# Patient Record
Sex: FEMALE | Race: WHITE | URBAN - METROPOLITAN AREA
[De-identification: names, ages, dates, MRNs, and addresses within clinical notes are randomized per-mention and may not be internally consistent; named-entity substitution may affect disease eponyms.]

---

## 2023-04-20 ENCOUNTER — OFFICE VISIT (OUTPATIENT)
Dept: URBAN - METROPOLITAN AREA CLINIC 46 | Facility: CLINIC | Age: 88
End: 2023-04-20
Payer: MEDICARE

## 2023-04-20 DIAGNOSIS — H35.3111 NONEXUDATIVE MACULAR DEGENERATION, EARLY DRY STAGE, RIGHT EYE: ICD-10-CM

## 2023-04-20 DIAGNOSIS — Z96.1 PRESENCE OF INTRAOCULAR LENS: ICD-10-CM

## 2023-04-20 DIAGNOSIS — H35.3223 EXUDATIVE MACULAR DEGENERATION, INACTIVE SCAR, LEFT EYE: Primary | ICD-10-CM

## 2023-04-20 PROCEDURE — 99214 OFFICE O/P EST MOD 30 MIN: CPT | Performed by: OPHTHALMOLOGY

## 2023-04-20 PROCEDURE — 92134 CPTRZ OPH DX IMG PST SGM RTA: CPT | Performed by: OPHTHALMOLOGY

## 2023-04-20 ASSESSMENT — INTRAOCULAR PRESSURE
OD: 16
OS: 15

## 2023-04-20 NOTE — IMPRESSION/PLAN
Impression: Nonexudative macular degeneration, early dry stage, right eye: H35.3111. Plan: Patient advised that their macular degeneration appears stable. They are advised to continue AREDS/AREDS2 and the [employer]msler grid. We will continue to monitor periodically; call if any changes noted.

## 2023-04-20 NOTE — IMPRESSION/PLAN
Impression: Exudative macular degeneration, inactive scar, left eye: H33.9184. Plan: Patient advised that their macular degeneration appears inactive. Patient hasn't been here in almost two years. It seems like in that time, some swelling occurred and resolved on it's own and a scar now remains. Advised to continue AREDS/AREDS2 and the [employer] grid. We will continue to monitor periodically; call if any changes noted.

## 2023-06-07 ENCOUNTER — OFFICE VISIT (OUTPATIENT)
Dept: URBAN - METROPOLITAN AREA CLINIC 46 | Facility: CLINIC | Age: 88
End: 2023-06-07
Payer: MEDICARE

## 2023-06-07 DIAGNOSIS — Z96.1 PRESENCE OF INTRAOCULAR LENS: ICD-10-CM

## 2023-06-07 DIAGNOSIS — H35.3111 NONEXUDATIVE AGE-RELATED MACULAR DEGENERATION, RIGHT EYE, EARLY DRY STAGE: Primary | ICD-10-CM

## 2023-06-07 DIAGNOSIS — H35.3223 EXUDATIVE MACULAR DEGENERATION, INACTIVE SCAR, LEFT EYE: ICD-10-CM

## 2023-06-07 PROCEDURE — 92134 CPTRZ OPH DX IMG PST SGM RTA: CPT | Performed by: OPHTHALMOLOGY

## 2023-06-07 PROCEDURE — 99214 OFFICE O/P EST MOD 30 MIN: CPT | Performed by: OPHTHALMOLOGY

## 2023-06-07 ASSESSMENT — INTRAOCULAR PRESSURE
OS: 13
OD: 15

## 2023-06-07 NOTE — IMPRESSION/PLAN
Impression: Exudative macular degeneration, inactive scar, left eye: H39.5176. Plan: Patient advised that their macular degeneration appears inactive. Patient hasn't been here in almost two years. It seems like in that time, some swelling occurred and resolved on it's own and a scar now remains. Advised to continue AREDS/AREDS2 and the [employer] grid. We will continue to monitor periodically; call if any changes noted.

## 2023-12-20 ENCOUNTER — OFFICE VISIT (OUTPATIENT)
Dept: URBAN - METROPOLITAN AREA CLINIC 46 | Facility: CLINIC | Age: 88
End: 2023-12-20
Payer: MEDICARE

## 2023-12-20 DIAGNOSIS — H35.3223 EXUDATIVE MACULAR DEGENERATION, INACTIVE SCAR, LEFT EYE: ICD-10-CM

## 2023-12-20 DIAGNOSIS — H35.3111 NONEXUDATIVE AGE-RELATED MACULAR DEGENERATION, RIGHT EYE, EARLY DRY STAGE: Primary | ICD-10-CM

## 2023-12-20 DIAGNOSIS — H16.223 KERATOCONJUNCTIVITIS SICCA, BILATERAL: ICD-10-CM

## 2023-12-20 DIAGNOSIS — Z96.1 PRESENCE OF INTRAOCULAR LENS: ICD-10-CM

## 2023-12-20 PROCEDURE — 92134 CPTRZ OPH DX IMG PST SGM RTA: CPT | Performed by: OPHTHALMOLOGY

## 2023-12-20 PROCEDURE — 99214 OFFICE O/P EST MOD 30 MIN: CPT | Performed by: OPHTHALMOLOGY

## 2023-12-20 ASSESSMENT — INTRAOCULAR PRESSURE
OD: 14
OS: 14

## 2024-02-01 ENCOUNTER — OFFICE VISIT (OUTPATIENT)
Dept: URBAN - METROPOLITAN AREA CLINIC 46 | Facility: CLINIC | Age: 89
End: 2024-02-01
Payer: MEDICARE

## 2024-02-01 DIAGNOSIS — H35.3221 EXDTVE AGE-REL MCLR DEGN, LEFT EYE, WITH ACTV CHRDL NEOVAS: Primary | ICD-10-CM

## 2024-02-01 DIAGNOSIS — H35.3112 NEXDTVE AGE-RELATED MCLR DEGN, RIGHT EYE, INTERMED DRY STAGE: ICD-10-CM

## 2024-02-01 PROCEDURE — 67028 INJECTION EYE DRUG: CPT | Performed by: SPECIALIST

## 2024-02-01 PROCEDURE — 92134 CPTRZ OPH DX IMG PST SGM RTA: CPT | Performed by: SPECIALIST

## 2024-02-01 PROCEDURE — 99213 OFFICE O/P EST LOW 20 MIN: CPT | Performed by: SPECIALIST

## 2024-02-01 RX ORDER — BROMFENAC SODIUM 0.7 MG/ML
SOLUTION/ DROPS OPHTHALMIC
Refills: 0 | Status: ACTIVE
Start: 2024-02-01

## 2024-02-01 RX ORDER — UBIDECARENONE 100 MG
CAPSULE ORAL
Refills: 0 | Status: ACTIVE
Start: 2024-02-01

## 2024-02-01 RX ORDER — ASCORBIC ACID 500 MG
TABLET,CHEWABLE ORAL
Refills: 0 | Status: ACTIVE
Start: 2024-02-01

## 2024-02-01 RX ORDER — MAGNESIUM 200 MG
200 MG TABLET ORAL
Refills: 0 | Status: ACTIVE
Start: 2024-02-01

## 2024-02-01 ASSESSMENT — INTRAOCULAR PRESSURE
OD: 15
OS: 14

## 2024-12-02 ENCOUNTER — OFFICE VISIT (OUTPATIENT)
Dept: URBAN - METROPOLITAN AREA CLINIC 47 | Facility: CLINIC | Age: 89
End: 2024-12-02
Payer: MEDICARE

## 2024-12-02 DIAGNOSIS — H35.3112 NONEXUDATIVE AGE-RELATED MACULAR DEGENERATION, RIGHT EYE, INTERMEDIATE DRY STAGE: ICD-10-CM

## 2024-12-02 DIAGNOSIS — H33.8 OTHER RETINAL DETACHMENTS: ICD-10-CM

## 2024-12-02 DIAGNOSIS — H35.3221 EXUDATIVE AGE-RELATED MACULAR DEGENERATION, LEFT EYE, WITH ACTIVE CHOROIDAL NEOVASCULARIZATION: Primary | ICD-10-CM

## 2024-12-02 DIAGNOSIS — H43.811 VITREOUS DEGENERATION, RIGHT EYE: ICD-10-CM

## 2024-12-02 PROCEDURE — 92134 CPTRZ OPH DX IMG PST SGM RTA: CPT | Performed by: OPHTHALMOLOGY

## 2024-12-02 PROCEDURE — 99204 OFFICE O/P NEW MOD 45 MIN: CPT | Performed by: OPHTHALMOLOGY

## 2024-12-02 ASSESSMENT — INTRAOCULAR PRESSURE
OD: 14
OS: 14

## 2025-03-17 ENCOUNTER — OFFICE VISIT (OUTPATIENT)
Dept: URBAN - METROPOLITAN AREA CLINIC 47 | Facility: CLINIC | Age: 89
End: 2025-03-17
Payer: MEDICARE

## 2025-03-17 DIAGNOSIS — H33.8 OTHER RETINAL DETACHMENTS: ICD-10-CM

## 2025-03-17 DIAGNOSIS — H35.3221 EXUDATIVE AGE-RELATED MACULAR DEGENERATION, LEFT EYE, WITH ACTIVE CHOROIDAL NEOVASCULARIZATION: Primary | ICD-10-CM

## 2025-03-17 DIAGNOSIS — H43.811 VITREOUS DEGENERATION, RIGHT EYE: ICD-10-CM

## 2025-03-17 DIAGNOSIS — H35.3112 NONEXUDATIVE AGE-RELATED MACULAR DEGENERATION, RIGHT EYE, INTERMEDIATE DRY STAGE: ICD-10-CM

## 2025-03-17 PROCEDURE — 92134 CPTRZ OPH DX IMG PST SGM RTA: CPT | Performed by: OPHTHALMOLOGY

## 2025-03-17 PROCEDURE — 99213 OFFICE O/P EST LOW 20 MIN: CPT | Performed by: OPHTHALMOLOGY

## 2025-03-17 ASSESSMENT — INTRAOCULAR PRESSURE
OS: 18
OD: 15

## 2025-06-04 ENCOUNTER — OFFICE VISIT (OUTPATIENT)
Dept: URBAN - METROPOLITAN AREA CLINIC 46 | Facility: CLINIC | Age: 89
End: 2025-06-04
Payer: MEDICARE

## 2025-06-04 DIAGNOSIS — H16.143 PUNCTATE KERATITIS, BILATERAL: ICD-10-CM

## 2025-06-04 DIAGNOSIS — H35.3112 NONEXUDATIVE MACULAR DEGENERATION, INTERMEDIATE DRY STAGE, RIGHT EYE: ICD-10-CM

## 2025-06-04 DIAGNOSIS — H16.223 KERATOCONJUNCTIVITIS SICCA, BILATERAL: ICD-10-CM

## 2025-06-04 DIAGNOSIS — H52.4 PRESBYOPIA: ICD-10-CM

## 2025-06-04 DIAGNOSIS — H35.3221 EXUDATIVE MACULAR DEGENERATION, WITH ACTIVE CHOROIDAL NEOVASCULARIZATION, LEFT EYE: Primary | ICD-10-CM

## 2025-06-04 DIAGNOSIS — H43.811 VITREOUS DEGENERATION, RIGHT EYE: ICD-10-CM

## 2025-06-04 DIAGNOSIS — Z96.1 PRESENCE OF INTRAOCULAR LENS: ICD-10-CM

## 2025-06-04 PROCEDURE — 92134 CPTRZ OPH DX IMG PST SGM RTA: CPT | Performed by: OPTOMETRIST

## 2025-06-04 PROCEDURE — 99204 OFFICE O/P NEW MOD 45 MIN: CPT | Performed by: OPTOMETRIST

## 2025-06-04 ASSESSMENT — KERATOMETRY
OD: 45.89
OS: 45.16

## 2025-06-04 ASSESSMENT — INTRAOCULAR PRESSURE
OS: 15
OD: 14

## 2025-06-04 ASSESSMENT — VISUAL ACUITY
OD: 20/40
OS: 20/40

## 2025-06-09 ENCOUNTER — OFFICE VISIT (OUTPATIENT)
Dept: URBAN - METROPOLITAN AREA CLINIC 47 | Facility: CLINIC | Age: 89
End: 2025-06-09
Payer: MEDICARE

## 2025-06-09 DIAGNOSIS — H33.8 OTHER RETINAL DETACHMENTS: ICD-10-CM

## 2025-06-09 DIAGNOSIS — H35.3112 NONEXUDATIVE AGE-RELATED MACULAR DEGENERATION, RIGHT EYE, INTERMEDIATE DRY STAGE: ICD-10-CM

## 2025-06-09 DIAGNOSIS — H35.3221 EXUDATIVE AGE-RELATED MACULAR DEGENERATION, LEFT EYE, WITH ACTIVE CHOROIDAL NEOVASCULARIZATION: Primary | ICD-10-CM

## 2025-06-09 DIAGNOSIS — H43.811 VITREOUS DEGENERATION, RIGHT EYE: ICD-10-CM

## 2025-06-09 PROCEDURE — 92134 CPTRZ OPH DX IMG PST SGM RTA: CPT | Performed by: OPHTHALMOLOGY

## 2025-06-09 PROCEDURE — 99213 OFFICE O/P EST LOW 20 MIN: CPT | Performed by: OPHTHALMOLOGY

## 2025-06-09 ASSESSMENT — INTRAOCULAR PRESSURE
OD: 12
OS: 13